# Patient Record
Sex: FEMALE | Race: WHITE | NOT HISPANIC OR LATINO | URBAN - METROPOLITAN AREA
[De-identification: names, ages, dates, MRNs, and addresses within clinical notes are randomized per-mention and may not be internally consistent; named-entity substitution may affect disease eponyms.]

---

## 2018-03-26 ENCOUNTER — IMPORTED ENCOUNTER (OUTPATIENT)
Dept: URBAN - METROPOLITAN AREA CLINIC 50 | Facility: CLINIC | Age: 78
End: 2018-03-26

## 2019-11-05 ENCOUNTER — IMPORTED ENCOUNTER (OUTPATIENT)
Dept: URBAN - METROPOLITAN AREA CLINIC 50 | Facility: CLINIC | Age: 79
End: 2019-11-05

## 2020-11-11 ENCOUNTER — IMPORTED ENCOUNTER (OUTPATIENT)
Dept: URBAN - METROPOLITAN AREA CLINIC 50 | Facility: CLINIC | Age: 80
End: 2020-11-11

## 2021-01-18 ENCOUNTER — IMPORTED ENCOUNTER (OUTPATIENT)
Dept: URBAN - METROPOLITAN AREA CLINIC 50 | Facility: CLINIC | Age: 81
End: 2021-01-18

## 2021-01-19 ENCOUNTER — IMPORTED ENCOUNTER (OUTPATIENT)
Dept: URBAN - METROPOLITAN AREA CLINIC 50 | Facility: CLINIC | Age: 81
End: 2021-01-19

## 2021-01-19 NOTE — PATIENT DISCUSSION
"""Informed patient that their capsular opacification is visually significant but we will hold on yag ""

## 2021-01-21 ENCOUNTER — IMPORTED ENCOUNTER (OUTPATIENT)
Dept: URBAN - METROPOLITAN AREA CLINIC 50 | Facility: CLINIC | Age: 81
End: 2021-01-21

## 2021-01-22 ENCOUNTER — IMPORTED ENCOUNTER (OUTPATIENT)
Dept: URBAN - METROPOLITAN AREA CLINIC 50 | Facility: CLINIC | Age: 81
End: 2021-01-22

## 2021-01-22 NOTE — PATIENT DISCUSSION
"""Follow up with patient today. IOP is 15/14 at today's visit. Patient is stable with Iritis OS.  Will see ""

## 2021-02-05 ENCOUNTER — IMPORTED ENCOUNTER (OUTPATIENT)
Dept: URBAN - METROPOLITAN AREA CLINIC 50 | Facility: CLINIC | Age: 81
End: 2021-02-05

## 2021-03-05 ENCOUNTER — IMPORTED ENCOUNTER (OUTPATIENT)
Dept: URBAN - METROPOLITAN AREA CLINIC 50 | Facility: CLINIC | Age: 81
End: 2021-03-05

## 2021-04-18 ASSESSMENT — TONOMETRY
OS_IOP_MMHG: 17
OS_IOP_MMHG: 15
OS_IOP_MMHG: 14
OD_IOP_MMHG: 15
OS_IOP_MMHG: 14
OS_IOP_MMHG: 15
OD_IOP_MMHG: 15
OD_IOP_MMHG: 15
OS_IOP_MMHG: 14
OD_IOP_MMHG: 15
OD_IOP_MMHG: 15
OD_IOP_MMHG: 14
OD_IOP_MMHG: 16
OS_IOP_MMHG: 15

## 2021-04-18 ASSESSMENT — VISUAL ACUITY
OS_CC: 20/30-
OD_CC: J1@ 16 IN
OD_BAT: 20/30
OS_CC: J1+
OS_CC: 20/25-
OS_BAT: 20/40
OS_OTHER: 20/40. 20/50.
OS_CC: 20/25
OD_CC: 20/25
OS_CC: J1@ 16 IN
OS_CC: J1@ 16 IN
OS_OTHER: 20/40. 20/70.
OS_CC: 20/25
OD_CC: 20/25
OS_PH: @ 16 IN
OD_PH: @ 16 IN
OS_CC: 20/30
OS_OTHER: 20/40. 20/50.
OD_CC: J1@ 16 IN
OS_BAT: 20/40
OD_BAT: 20/40
OD_CC: J1+
OD_CC: 20/25
OS_BAT: 20/40
OS_CC: 20/40
OD_CC: 20/30+2
OS_CC: J1
OD_BAT: 20/30
OS_CC: 20/30+2
OD_OTHER: 20/30. 20/40.
OD_OTHER: 20/30. 20/60.
OS_PH: 20/25
OD_CC: 20/30+1
OD_CC: 20/25-
OS_PH: 20/30
OD_OTHER: 20/40. 20/40.
OD_CC: 20/25
OD_CC: J1

## 2021-11-05 ENCOUNTER — PREPPED CHART (OUTPATIENT)
Dept: URBAN - METROPOLITAN AREA CLINIC 49 | Facility: CLINIC | Age: 81
End: 2021-11-05

## 2021-11-10 ENCOUNTER — COMPREHENSIVE EXAM (OUTPATIENT)
Dept: URBAN - METROPOLITAN AREA CLINIC 49 | Facility: CLINIC | Age: 81
End: 2021-11-10

## 2021-11-10 DIAGNOSIS — H43.813: ICD-10-CM

## 2021-11-10 DIAGNOSIS — E11.9: ICD-10-CM

## 2021-11-10 DIAGNOSIS — H26.493: ICD-10-CM

## 2021-11-10 PROCEDURE — 92014 COMPRE OPH EXAM EST PT 1/>: CPT

## 2021-11-10 ASSESSMENT — TONOMETRY
OD_IOP_MMHG: 15
OS_IOP_MMHG: 15

## 2021-11-10 ASSESSMENT — VISUAL ACUITY
OU_CC: J1+
OD_PH: 20/25
OD_GLARE: 20/40
OD_CC: J1
OS_CC: 20/30
OS_PH: 20/25
OD_CC: 20/30
OU_CC: 20/25
OS_CC: J1
OS_GLARE: 20/40

## 2021-11-10 NOTE — PROCEDURE NOTE: CLINICAL
PROCEDURE NOTE: YAG Capsulotomy OD. Prep: Mydriacil 1% and Phenylephrine 2.5%. Prior to treatment, the risks/benefits/alternatives were discussed. The patient wished to proceed with procedure. Consent was signed. Proparacaine and brominidine were placed into the operative eye after the eye was dilated. Power = 2.8mJ. Number of pulses = 17. Patient tolerated procedure well and there were no complications. Post Laser instructions given. S/P Yag Cap IOP: 15 - JFA. Vicki Mccord

## 2022-02-11 ENCOUNTER — ESTABLISHED PATIENT (OUTPATIENT)
Dept: URBAN - METROPOLITAN AREA CLINIC 49 | Facility: CLINIC | Age: 82
End: 2022-02-11

## 2022-02-11 DIAGNOSIS — H10.45: ICD-10-CM

## 2022-02-11 DIAGNOSIS — H04.123: ICD-10-CM

## 2022-02-11 PROCEDURE — 92012 INTRM OPH EXAM EST PATIENT: CPT

## 2022-02-11 RX ORDER — LOTEPREDNOL ETABONATE 5 MG/ML: 1 SUSPENSION/ DROPS OPHTHALMIC TWICE A DAY

## 2022-02-11 ASSESSMENT — TONOMETRY
OS_IOP_MMHG: 14
OD_IOP_MMHG: 14

## 2022-02-11 ASSESSMENT — VISUAL ACUITY
OS_CC: 20/25-2
OD_CC: 20/25-2

## 2022-03-18 ENCOUNTER — FOLLOW UP (OUTPATIENT)
Dept: URBAN - METROPOLITAN AREA CLINIC 49 | Facility: CLINIC | Age: 82
End: 2022-03-18

## 2022-03-18 DIAGNOSIS — H10.45: ICD-10-CM

## 2022-03-18 DIAGNOSIS — H04.123: ICD-10-CM

## 2022-03-18 PROCEDURE — 92012 INTRM OPH EXAM EST PATIENT: CPT

## 2022-03-18 RX ORDER — OLOPATADINE HYDROCHLORIDE 2 MG/ML: 1 SOLUTION OPHTHALMIC ONCE A DAY

## 2022-03-18 ASSESSMENT — TONOMETRY
OD_IOP_MMHG: 14
OS_IOP_MMHG: 14

## 2022-03-18 ASSESSMENT — VISUAL ACUITY
OD_CC: 20/25-2
OS_CC: 20/25-2

## 2022-03-18 NOTE — PATIENT DISCUSSION
Improving. Patient can d/c steriod. Advised patient to start Pataday extra strength one time a day OU, provided sample today in office. And continue PF artificial tears.

## 2022-11-23 ENCOUNTER — COMPREHENSIVE EXAM (OUTPATIENT)
Dept: URBAN - METROPOLITAN AREA CLINIC 49 | Facility: CLINIC | Age: 82
End: 2022-11-23

## 2022-11-23 DIAGNOSIS — H43.813: ICD-10-CM

## 2022-11-23 DIAGNOSIS — H40.013: ICD-10-CM

## 2022-11-23 DIAGNOSIS — H26.492: ICD-10-CM

## 2022-11-23 DIAGNOSIS — E11.9: ICD-10-CM

## 2022-11-23 PROCEDURE — 92014 COMPRE OPH EXAM EST PT 1/>: CPT

## 2022-11-23 PROCEDURE — 76514 ECHO EXAM OF EYE THICKNESS: CPT

## 2022-11-23 PROCEDURE — 92015 DETERMINE REFRACTIVE STATE: CPT

## 2022-11-23 ASSESSMENT — PACHYMETRY
OD_CT_UM: 491
OS_CT_UM: 516

## 2022-11-23 ASSESSMENT — VISUAL ACUITY
OD_GLARE: 20/30-1
OU_CC: J1 @ 14 IN
OD_CC: 20/30+1
OS_CC: 20/30+2
OS_GLARE: 20/30
OS_GLARE: 20/30
OD_GLARE: 20/30-1

## 2022-11-23 ASSESSMENT — TONOMETRY
OD_IOP_MMHG: 14
OD_IOP_MMHG: 18
OS_IOP_MMHG: 14
OS_IOP_MMHG: 16

## 2023-12-04 ENCOUNTER — COMPREHENSIVE EXAM (OUTPATIENT)
Dept: URBAN - METROPOLITAN AREA CLINIC 49 | Facility: LOCATION | Age: 83
End: 2023-12-04

## 2023-12-04 DIAGNOSIS — H26.492: ICD-10-CM

## 2023-12-04 DIAGNOSIS — H04.123: ICD-10-CM

## 2023-12-04 DIAGNOSIS — E11.9: ICD-10-CM

## 2023-12-04 DIAGNOSIS — H43.813: ICD-10-CM

## 2023-12-04 PROCEDURE — 92014 COMPRE OPH EXAM EST PT 1/>: CPT

## 2023-12-04 ASSESSMENT — VISUAL ACUITY
OU_CC: J1@16"
OS_CC: 20/25-2
OD_CC: 20/25
OS_GLARE: 20/25
OS_GLARE: 20/40

## 2023-12-04 ASSESSMENT — TONOMETRY
OD_IOP_MMHG: 13
OS_IOP_MMHG: 15
OS_IOP_MMHG: 13
OD_IOP_MMHG: 17

## 2025-07-01 ENCOUNTER — COMPREHENSIVE EXAM (OUTPATIENT)
Age: 85
End: 2025-07-01

## 2025-07-01 DIAGNOSIS — H40.013: ICD-10-CM

## 2025-07-01 DIAGNOSIS — H26.492: ICD-10-CM

## 2025-07-01 DIAGNOSIS — H04.123: ICD-10-CM

## 2025-07-01 DIAGNOSIS — E11.9: ICD-10-CM

## 2025-07-01 DIAGNOSIS — H52.4: ICD-10-CM

## 2025-07-01 PROCEDURE — 99214 OFFICE O/P EST MOD 30 MIN: CPT

## 2025-07-01 PROCEDURE — 92015 DETERMINE REFRACTIVE STATE: CPT
